# Patient Record
Sex: FEMALE | Race: WHITE | URBAN - METROPOLITAN AREA
[De-identification: names, ages, dates, MRNs, and addresses within clinical notes are randomized per-mention and may not be internally consistent; named-entity substitution may affect disease eponyms.]

---

## 2017-10-03 PROBLEM — 197480006 ANXIETY DISORDER: Status: ACTIVE | Noted: 2017-10-03

## 2017-10-03 PROBLEM — 38341003 HYPERTENSION: Status: ACTIVE | Noted: 2017-10-03

## 2017-10-03 PROBLEM — 165232002 URINARY INCONTINENCE: Status: ACTIVE | Noted: 2017-10-03

## 2017-10-03 PROBLEM — 428283002 HISTORY OF POLYP OF COLON: Status: ACTIVE | Noted: 2017-10-03

## 2017-10-11 PROBLEM — 161701005 HISTORY OF RESPIRATOR DEPENDENCE: Status: ACTIVE | Noted: 2017-10-11

## 2020-03-05 PROBLEM — 235595009 GASTROESOPHAGEAL REFLUX DISEASE: Status: ACTIVE | Noted: 2020-03-05

## 2020-03-05 PROBLEM — 134407002 CHRONIC BACK PAIN: Status: ACTIVE | Noted: 2020-03-05

## 2020-03-05 PROBLEM — 13644009 HYPERCHOLESTEROLEMIA: Status: ACTIVE | Noted: 2020-03-05

## 2021-01-12 ENCOUNTER — OFFICE VISIT (OUTPATIENT)
Dept: URBAN - METROPOLITAN AREA CLINIC 46 | Facility: CLINIC | Age: 71
End: 2021-01-12

## 2021-01-12 PROBLEM — 3723001 ARTHRITIS: Status: ACTIVE | Noted: 2021-01-12

## 2021-01-12 PROBLEM — 313436004 TYPE II DIABETES MELLITUS WITHOUT COMPLICATION: Status: ACTIVE | Noted: 2021-01-12

## 2021-01-12 PROBLEM — 197321007 FATTY LIVER: Status: ACTIVE | Noted: 2021-01-12

## 2021-01-12 PROBLEM — 73430006 SLEEP APNEA: Status: ACTIVE | Noted: 2021-01-12

## 2021-01-12 PROBLEM — 35489007 DEPRESSION: Status: ACTIVE | Noted: 2021-01-12

## 2021-01-21 ENCOUNTER — OFFICE VISIT (OUTPATIENT)
Dept: URBAN - METROPOLITAN AREA SURGERY CENTER 28 | Facility: SURGERY CENTER | Age: 71
End: 2021-01-21

## 2021-01-21 PROBLEM — 78275009 OBSTRUCTIVE SLEEP APNEA: Status: ACTIVE | Noted: 2021-01-21

## 2021-01-21 LAB — PDFREPORT1: (no result)

## 2021-01-22 ENCOUNTER — OFFICE VISIT (OUTPATIENT)
Dept: URBAN - METROPOLITAN AREA CLINIC 13 | Facility: CLINIC | Age: 71
End: 2021-01-22

## 2021-01-25 ENCOUNTER — OFFICE VISIT (OUTPATIENT)
Dept: URBAN - METROPOLITAN AREA CLINIC 13 | Facility: CLINIC | Age: 71
End: 2021-01-25

## 2021-03-08 ENCOUNTER — OFFICE VISIT (OUTPATIENT)
Dept: URBAN - METROPOLITAN AREA CLINIC 46 | Facility: CLINIC | Age: 71
End: 2021-03-08

## 2021-03-08 PROBLEM — 398050005 DIVERTICULAR DISEASE OF COLON: Status: ACTIVE | Noted: 2021-03-08

## 2021-03-08 PROBLEM — 70153002 HEMORRHOIDS: Status: ACTIVE | Noted: 2021-03-08

## 2021-08-28 ENCOUNTER — TELEPHONE ENCOUNTER (OUTPATIENT)
Dept: URBAN - METROPOLITAN AREA CLINIC 13 | Facility: CLINIC | Age: 71
End: 2021-08-28

## 2021-08-28 RX ORDER — METOPROLOL TARTRATE 25 MG/1
TABLET, FILM COATED ORAL
OUTPATIENT
End: 2021-01-12

## 2021-08-28 RX ORDER — METRONIDAZOLE 500 MG/1
TABLET ORAL
OUTPATIENT
Start: 2017-12-13 | End: 2018-09-14

## 2021-08-28 RX ORDER — CIPROFLOXACIN HYDROCHLORIDE 500 MG/1
TABLET, FILM COATED ORAL
OUTPATIENT
Start: 2019-07-15 | End: 2019-09-03

## 2021-08-28 RX ORDER — OXYBUTYNIN CHLORIDE 5 MG/1
TABLET ORAL
OUTPATIENT
End: 2019-09-03

## 2021-08-28 RX ORDER — IBUPROFEN, DIPHENHYDRAMINE HCL 200; 25 MG/1; MG/1
CAPSULE, LIQUID FILLED ORAL
OUTPATIENT
End: 2018-09-14

## 2021-08-28 RX ORDER — NITAZOXANIDE 500 MG/1
TABLET ORAL
OUTPATIENT
Start: 2017-12-07 | End: 2018-09-14

## 2021-08-28 RX ORDER — IMMUNE GLOB/PLASMA FRA BOVINE 5 G
POWDER IN PACKET (EA) ORAL
OUTPATIENT
Start: 2019-10-28 | End: 2021-01-12

## 2021-08-28 RX ORDER — OMEPRAZOLE MAGNESIUM 20 MG
CAPSULE,DELAYED RELEASE (ENTERIC COATED) ORAL
OUTPATIENT
Start: 2018-09-14 | End: 2019-10-28

## 2021-08-28 RX ORDER — CIPROFLOXACIN HYDROCHLORIDE 500 MG/1
TABLET, FILM COATED ORAL
OUTPATIENT
Start: 2021-01-12 | End: 2021-01-21

## 2021-08-28 RX ORDER — METRONIDAZOLE 500 MG/1
TABLET ORAL
OUTPATIENT
Start: 2018-09-14 | End: 2018-10-04

## 2021-08-28 RX ORDER — PANCRELIPASE LIPASE, PANCRELIPASE PROTEASE, PANCRELIPASE AMYLASE 40000; 126000; 168000 [USP'U]/1; [USP'U]/1; [USP'U]/1
CAPSULE, DELAYED RELEASE ORAL
OUTPATIENT
Start: 2019-09-03 | End: 2019-10-28

## 2021-08-28 RX ORDER — SUCRALFATE 1 G/1
TABLET ORAL
OUTPATIENT
Start: 2018-10-04 | End: 2019-09-03

## 2021-08-28 RX ORDER — BIFIDOBACTERIUM LONGUM 10MM CELL
CAPSULE ORAL
OUTPATIENT
Start: 2017-12-07 | End: 2018-09-14

## 2021-08-29 ENCOUNTER — TELEPHONE ENCOUNTER (OUTPATIENT)
Dept: URBAN - METROPOLITAN AREA CLINIC 13 | Facility: CLINIC | Age: 71
End: 2021-08-29

## 2021-08-29 RX ORDER — OMEPRAZOLE 10 MG/1
CAPSULE, DELAYED RELEASE ORAL
Status: ACTIVE | COMMUNITY
Start: 2019-10-10

## 2021-08-29 RX ORDER — AMLODIPINE BESYLATE 10 MG/1
TABLET ORAL
Status: ACTIVE | COMMUNITY

## 2021-08-29 RX ORDER — SERTRALINE 100 MG/1
TABLET, FILM COATED ORAL
Status: ACTIVE | COMMUNITY

## 2021-08-29 RX ORDER — BIFIDOBACTERIUM LONGUM 10MM CELL
CAPSULE ORAL
Status: ACTIVE | COMMUNITY
Start: 2018-09-14

## 2021-08-29 RX ORDER — ATORVASTATIN CALCIUM 10 MG/1
TABLET ORAL
Status: ACTIVE | COMMUNITY

## 2021-08-29 RX ORDER — CLONAZEPAM 0.5 MG/1
TABLET ORAL
Status: ACTIVE | COMMUNITY

## 2022-02-21 ENCOUNTER — TELEPHONE ENCOUNTER (OUTPATIENT)
Dept: URBAN - METROPOLITAN AREA CLINIC 46 | Facility: CLINIC | Age: 72
End: 2022-02-21

## 2022-02-21 RX ORDER — OMEPRAZOLE 20 MG/1
1 CAPSULE 30 MINUTES BEFORE MORNING MEAL CAPSULE, DELAYED RELEASE ORAL ONCE A DAY
Qty: 90 CAPSULE | Refills: 3
Start: 2019-10-10

## 2023-03-16 ENCOUNTER — ERX REFILL RESPONSE (OUTPATIENT)
Dept: URBAN - NONMETROPOLITAN AREA CLINIC 9 | Facility: CLINIC | Age: 73
End: 2023-03-16

## 2023-03-16 RX ORDER — OMEPRAZOLE 20 MG/1
TAKE ONE CAPSULE BY MOUTH ONE TIME DAILY 30 MINUTES BEFORE BREAKFAST CAPSULE, DELAYED RELEASE ORAL
Qty: 90 CAPSULE | Refills: 3 | OUTPATIENT

## 2023-03-16 RX ORDER — OMEPRAZOLE 20 MG/1
1 CAPSULE 30 MINUTES BEFORE MORNING MEAL CAPSULE, DELAYED RELEASE ORAL ONCE A DAY
Qty: 90 CAPSULE | Refills: 3 | OUTPATIENT

## 2023-05-30 ENCOUNTER — OFFICE VISIT (OUTPATIENT)
Dept: URBAN - METROPOLITAN AREA CLINIC 44 | Facility: CLINIC | Age: 73
End: 2023-05-30

## 2023-05-30 RX ORDER — ATORVASTATIN CALCIUM 10 MG/1
1 TABLET TABLET ORAL ONCE A DAY
Status: ACTIVE | COMMUNITY

## 2023-05-30 RX ORDER — ACETAMINOPHEN 325 MG/1
1 TABLET AS NEEDED TABLET, FILM COATED ORAL
Refills: 0 | Status: ACTIVE | COMMUNITY
Start: 1900-01-01

## 2023-05-30 RX ORDER — AMLODIPINE BESYLATE 10 MG/1
1 TABLET TABLET ORAL ONCE A DAY
Status: ACTIVE | COMMUNITY

## 2023-05-30 RX ORDER — CLONAZEPAM 0.5 MG/1
1 TABLET AT BEDTIME TABLET ORAL ONCE A DAY
Status: ACTIVE | COMMUNITY

## 2023-05-30 RX ORDER — SERTRALINE 100 MG/1
1 TABLET TABLET, FILM COATED ORAL ONCE A DAY
Status: ACTIVE | COMMUNITY

## 2023-05-30 RX ORDER — OMEPRAZOLE 20 MG/1
1 CAPSULE 30 MINUTES BEFORE MORNING MEAL CAPSULE, DELAYED RELEASE ORAL ONCE A DAY
Refills: 3 | Status: ACTIVE | COMMUNITY

## 2023-10-24 ENCOUNTER — OFFICE VISIT (OUTPATIENT)
Dept: URBAN - METROPOLITAN AREA CLINIC 44 | Facility: CLINIC | Age: 73
End: 2023-10-24
Payer: MEDICARE

## 2023-10-24 ENCOUNTER — LAB OUTSIDE AN ENCOUNTER (OUTPATIENT)
Dept: URBAN - METROPOLITAN AREA CLINIC 44 | Facility: CLINIC | Age: 73
End: 2023-10-24

## 2023-10-24 ENCOUNTER — DASHBOARD ENCOUNTERS (OUTPATIENT)
Age: 73
End: 2023-10-24

## 2023-10-24 VITALS
BODY MASS INDEX: 31.75 KG/M2 | HEIGHT: 63 IN | WEIGHT: 179.2 LBS | TEMPERATURE: 97.3 F | DIASTOLIC BLOOD PRESSURE: 63 MMHG | SYSTOLIC BLOOD PRESSURE: 133 MMHG | HEART RATE: 68 BPM

## 2023-10-24 DIAGNOSIS — R19.4 BOWEL HABIT CHANGES: ICD-10-CM

## 2023-10-24 DIAGNOSIS — R07.89 CHEST PRESSURE: ICD-10-CM

## 2023-10-24 DIAGNOSIS — R10.13 DYSPEPSIA: ICD-10-CM

## 2023-10-24 PROCEDURE — 99204 OFFICE O/P NEW MOD 45 MIN: CPT | Performed by: INTERNAL MEDICINE

## 2023-10-24 RX ORDER — PANTOPRAZOLE SODIUM 40 MG/1
1 TABLET TABLET, DELAYED RELEASE ORAL ONCE A DAY
Qty: 30 | OUTPATIENT
Start: 2023-10-24

## 2023-10-24 RX ORDER — OMEPRAZOLE 20 MG/1
TAKE ONE CAPSULE BY MOUTH ONE TIME DAILY 30 MINUTES BEFORE BREAKFAST CAPSULE, DELAYED RELEASE ORAL
Qty: 90 UNSPECIFIED | Refills: 2 | Status: ACTIVE | COMMUNITY

## 2023-10-24 RX ORDER — CHLORTHALIDONE 25 MG/1
TAKE ONE TABLET BY MOUTH EVERY MORNING TABLET ORAL
Qty: 30 UNSPECIFIED | Refills: 0 | Status: ACTIVE | COMMUNITY

## 2023-10-24 RX ORDER — AMLODIPINE BESYLATE 5 MG/1
TAKE ONE TABLET BY MOUTH ONE TIME DAILY TABLET ORAL
Qty: 90 UNSPECIFIED | Refills: 0 | Status: ACTIVE | COMMUNITY

## 2023-10-24 RX ORDER — BUPROPION HYDROCHLORIDE 100 MG/1
TABLET, EXTENDED RELEASE ORAL
Qty: 90 TABLET | Status: ACTIVE | COMMUNITY

## 2023-10-24 RX ORDER — QUETIAPINE FUMARATE 100 MG/1
TABLET ORAL
Qty: 90 TABLET | Status: ACTIVE | COMMUNITY

## 2023-10-24 RX ORDER — GUANFACINE 1 MG/1
TAKE ONE TABLET BY MOUTH ONE TIME DAILY TABLET, EXTENDED RELEASE ORAL
Qty: 90 UNSPECIFIED | Refills: 0 | Status: ACTIVE | COMMUNITY

## 2023-10-24 RX ORDER — SERTRALINE 100 MG/1
TAKE TWO TABLETS BY MOUTH EVERY MORNING TABLET, FILM COATED ORAL
Qty: 180 UNSPECIFIED | Refills: 1 | Status: ACTIVE | COMMUNITY

## 2023-10-24 RX ORDER — LISINOPRIL 10 MG/1
TAKE ONE TABLET BY MOUTH EVERY MORNING TABLET ORAL
Qty: 90 UNSPECIFIED | Refills: 0 | Status: ACTIVE | COMMUNITY

## 2023-10-24 RX ORDER — HYDROCHLOROTHIAZIDE 25 MG/1
TAKE ONE TABLET BY MOUTH ONE TIME DAILY TABLET ORAL
Qty: 90 UNSPECIFIED | Refills: 0 | Status: ACTIVE | COMMUNITY

## 2023-10-24 NOTE — HPI-TODAY'S VISIT:
Rosa Hidalgo is a 73 year old, female, hx of multiple abdominal surgeries with a gastric bypass in 2004;PSBO in 2015 with exploration at [facility]; RLQ hernia repair 2/17; ventral hernia stklqa81/17; GJ ulcer in 2018 and repeat GD 3/2020 had healed. Now presents with a flare of daily postprandial diarrhea for the past 3 months and recurrent solid food dysphagia. Labs this week with normalCBC and CMP; TSH. A diagnostic EGD and colonoscopy was then completed on 6/01/21 that was unremarkable. Today, she mentions that she has changes in bowel habits with constipation. BMs have been daily but are now every few days or so. Stools are not hard but on occasion she has fecal incontinence. Denies hematochezia. She also admits to bloating in her stomach that can cause shortness of breath. Denies medication changes. Trials of OTC laxatives have not helped. Denies nausea, vomiting or weight loss. In addition she mentions intermittent chest pressure localized to the parasternal regions(R sided mostly) that is not aggravated by foods. Her symptom lasts for a few minutes and resolves. No radiation.

## 2024-01-02 ENCOUNTER — OFFICE VISIT (OUTPATIENT)
Dept: URBAN - METROPOLITAN AREA CLINIC 48 | Facility: CLINIC | Age: 74
End: 2024-01-02